# Patient Record
Sex: MALE | Race: WHITE | NOT HISPANIC OR LATINO | Employment: OTHER | URBAN - METROPOLITAN AREA
[De-identification: names, ages, dates, MRNs, and addresses within clinical notes are randomized per-mention and may not be internally consistent; named-entity substitution may affect disease eponyms.]

---

## 2017-07-19 PROBLEM — D50.9 IRON DEFICIENCY ANEMIA: Status: ACTIVE | Noted: 2017-07-19

## 2017-10-09 PROBLEM — R19.5 ABNORMAL STOOLS: Status: ACTIVE | Noted: 2017-10-09

## 2018-09-18 PROBLEM — D50.0 IRON DEFICIENCY ANEMIA DUE TO CHRONIC BLOOD LOSS: Status: ACTIVE | Noted: 2018-09-18

## 2018-09-18 PROBLEM — D50.9 IRON DEFICIENCY ANEMIA: Status: RESOLVED | Noted: 2017-07-19 | Resolved: 2018-09-18

## 2018-10-03 PROBLEM — E80.1 PORPHYRIA CUTANEA TARDA: Status: ACTIVE | Noted: 2018-10-03

## 2018-10-03 PROBLEM — R20.0 NUMBNESS OF FINGERS OF BOTH HANDS: Status: ACTIVE | Noted: 2018-10-03

## 2019-02-11 PROBLEM — I47.10 SVT (SUPRAVENTRICULAR TACHYCARDIA): Status: ACTIVE | Noted: 2019-02-11

## 2019-02-25 PROBLEM — G56.03 CARPAL TUNNEL SYNDROME, BILATERAL: Status: ACTIVE | Noted: 2019-02-25

## 2019-11-08 ENCOUNTER — OFFICE VISIT (OUTPATIENT)
Dept: OTOLARYNGOLOGY | Facility: CLINIC | Age: 44
End: 2019-11-08
Payer: MEDICARE

## 2019-11-08 VITALS
DIASTOLIC BLOOD PRESSURE: 93 MMHG | HEART RATE: 74 BPM | HEIGHT: 69 IN | BODY MASS INDEX: 37.52 KG/M2 | WEIGHT: 253.31 LBS | SYSTOLIC BLOOD PRESSURE: 140 MMHG

## 2019-11-08 DIAGNOSIS — J31.0 RHINITIS MEDICAMENTOSA: ICD-10-CM

## 2019-11-08 DIAGNOSIS — H69.93 ETD (EUSTACHIAN TUBE DYSFUNCTION), BILATERAL: ICD-10-CM

## 2019-11-08 DIAGNOSIS — R04.0 EPISTAXIS: Primary | ICD-10-CM

## 2019-11-08 DIAGNOSIS — T48.5X5A RHINITIS MEDICAMENTOSA: ICD-10-CM

## 2019-11-08 PROCEDURE — 99204 PR OFFICE/OUTPT VISIT, NEW, LEVL IV, 45-59 MIN: ICD-10-PCS | Mod: 25,S$PBB,, | Performed by: OTOLARYNGOLOGY

## 2019-11-08 PROCEDURE — 99203 OFFICE O/P NEW LOW 30 MIN: CPT | Mod: PBBFAC,PN,25 | Performed by: OTOLARYNGOLOGY

## 2019-11-08 PROCEDURE — 99204 OFFICE O/P NEW MOD 45 MIN: CPT | Mod: 25,S$PBB,, | Performed by: OTOLARYNGOLOGY

## 2019-11-08 PROCEDURE — 30901 CONTROL OF NOSEBLEED: CPT | Mod: S$PBB,LT,, | Performed by: OTOLARYNGOLOGY

## 2019-11-08 PROCEDURE — 99999 PR PBB SHADOW E&M-NEW PATIENT-LVL III: ICD-10-PCS | Mod: PBBFAC,,, | Performed by: OTOLARYNGOLOGY

## 2019-11-08 PROCEDURE — 30901 CONTROL OF NOSEBLEED: CPT | Mod: PBBFAC,PN | Performed by: OTOLARYNGOLOGY

## 2019-11-08 PROCEDURE — 99999 PR PBB SHADOW E&M-NEW PATIENT-LVL III: CPT | Mod: PBBFAC,,, | Performed by: OTOLARYNGOLOGY

## 2019-11-08 PROCEDURE — 30901 PR CTRL 2SEBLEED,ANTER,SIMPLE: ICD-10-PCS | Mod: S$PBB,LT,, | Performed by: OTOLARYNGOLOGY

## 2019-11-08 RX ORDER — FLUTICASONE PROPIONATE 50 MCG
2 SPRAY, SUSPENSION (ML) NASAL DAILY
Qty: 1 BOTTLE | Refills: 12 | Status: SHIPPED | OUTPATIENT
Start: 2019-11-08

## 2019-11-08 RX ORDER — PREDNISONE 20 MG/1
TABLET ORAL
Qty: 21 TABLET | Refills: 0 | Status: SHIPPED | OUTPATIENT
Start: 2019-11-08 | End: 2020-02-20 | Stop reason: ALTCHOICE

## 2019-11-08 RX ORDER — AMOXICILLIN AND CLAVULANATE POTASSIUM 875; 125 MG/1; MG/1
1 TABLET, FILM COATED ORAL 2 TIMES DAILY
Qty: 42 TABLET | Refills: 0 | Status: SHIPPED | OUTPATIENT
Start: 2019-11-08 | End: 2019-11-29

## 2019-11-08 NOTE — PROCEDURES
Procedures     PROCEDURE NOTE:  Control of Left  Anterior Epistaxis  Preprocedure diagnosis:  Recurrent epistaxis  Postprocedure diangosis:  Same  Complications:  None  Blood Loss:  Minimal    Procedure in detail:  After verbal consent was obtained, the patient's nasal cavity was anesthesized using topical Ponticaine.  Upon examination, the patient had ectatic vessels on his left anterior septum.  Under direct visualization with a head lamp and a nasal speculum, a silver nitrate stick was appiled to the patient's left anterior septum.  A minimal amount of bleeding was noted.  The patient tolerated the procedure well, and there were no significant complications.

## 2019-11-08 NOTE — PROGRESS NOTES
Chief Complaint   Patient presents with    Nasal Congestion     midface pain and pressure    Otalgia     bilateral, left ear worse, constant sharp pain in left ear    Ear Fullness   .    HPI:     Manuel Quiroga is a 44 y.o. male who is referred by Izzy Branham NP for evaluation of a several month  history of bilateral facail pain/pressure,  nasal congestion, and postnasal drip. He states these symptoms have been present for years.   He notes associated ear pian.  He describes difficulty breathing at night. There is bilateral nasal obstruction. He does use sinus rinses or nasal sprays. He has been using Astelin, Xyzal, Singulair, and Flonase with minimal relief. He is using Afrin nasal spray every 12 hours and sudafed daily.  He states that he has difficutly using the nasal sprays secondary to nosebleeds. He admits to midface pain and pressure.  He denies rhinorrhea and postnasal drip. There is not maxillary tooth pain. He  admits to headaches.  He has not had sinus or nasal surgery. There is no history of sinonasal trauma. Additionally, he has noted that he has been having nosebleeds from the left nostril daily for the last 5-7 days.    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    Seizures     Ulcer      Social History     Socioeconomic History    Marital status: Single     Spouse name: Not on file    Number of children: Not on file    Years of education: 14    Highest education level: Not on file   Occupational History    Not on file   Social Needs    Financial resource strain: Not on file    Food insecurity:     Worry: Not on file     Inability: Not on file    Transportation needs:     Medical:  Not on file     Non-medical: Not on file   Tobacco Use    Smoking status: Former Smoker     Packs/day: 2.00     Years: 9.00     Pack years: 18.00     Types: Cigarettes, Cigars     Start date: 7/18/1996     Last attempt to quit: 8/18/2004     Years since quitting: 15.2    Smokeless tobacco: Never Used   Substance and Sexual Activity    Alcohol use: No    Drug use: No    Sexual activity: Never   Lifestyle    Physical activity:     Days per week: Not on file     Minutes per session: Not on file    Stress: Not on file   Relationships    Social connections:     Talks on phone: Not on file     Gets together: Not on file     Attends Taoism service: Not on file     Active member of club or organization: Not on file     Attends meetings of clubs or organizations: Not on file     Relationship status: Not on file   Other Topics Concern    Not on file   Social History Narrative    Not on file     Past Surgical History:   Procedure Laterality Date    COLONOSCOPY      COLONOSCOPY N/A 10/9/2017    Procedure: COLONOSCOPY;  Surgeon: Bill Jones MD;  Location: [facility];  Service: Endoscopy;  Laterality: N/A;    MOUTH SURGERY      SKIN BIOPSY      SPINAL FUSION      TONSILLECTOMY       No family history on file.        Review of Systems  General: negative for chills, fever or weight loss  Psychological: negative for mood changes or depression  Ophthalmic: negative for blurry vision, photophobia or eye pain  ENT: see HPI  Respiratory: no cough, shortness of breath, or wheezing  Cardiovascular: no chest pain or dyspnea on exertion  Gastrointestinal: no abdominal pain, change in bowel habits, or black/ bloody stools  Musculoskeletal: negative for gait disturbance or muscular weakness  Neurological: no syncope or seizures; no ataxia  Dermatological: negative for puritis,  rash and jaundice  Hematologic/lymphatic: no easy bruising, no new lumps or bumps      Physical Exam:    Vitals:    11/08/19 1047   BP:  (!) 140/93   Pulse: 74       Constitutional: Well appearing / communicating without difficutly.  NAD.  Eyes: EOM I Bilaterally  Head/Face: Normocephalic.  Negative paranasal sinus pressure/tenderness.  Salivary glands WNL.  House Brackmann I Bilaterally.    Right Ear: Auricle normal appearance. External Auditory Canal within normal limits,TM w/o masses/lesions/perforations. TM mobility noted.   Left Ear: Auricle normal appearance. External Auditory Canal WNL,TM w/o masses/lesions/perforations. TM mobility noted.  Nose: No gross nasal septal deviation. Dilated blood vessels at left Kiesselbach's plexus. Inferior Turbinates 3+ bilaterally. No septal perforation. No masses/lesions. External nasal skin appears normal without masses/lesions.  Oral Cavity: Gingiva/lips within normal limits.  Dentition/gingiva healthy appearing. Mucus membranes moist. Floor of mouth soft, no masses palpated. Oral Tongue mobile. Hard Palate appears normal.    Oropharynx: Base of tongue appears normal. No masses/lesions noted. Tonsillar fossa/pharyngeal wall without lesions. Posterior oropharynx WNL.  Soft palate without masses. Midline uvula.   Neck/Lymphatic: No LAD I-VI bilaterally.  No thyromegaly.  No masses noted on exam.    Mirror laryngoscopy/nasopharyngoscopy: Active gag reflex.  Unable to perform.    Neuro/Psychiatric: AOx3.  Normal mood and affect.   Cardiovascular: Normal carotid pulses bilaterally, no increasing jugular venous distention noted at cervical region bilaterally.    Respiratory: Normal respiratory effort, no stridor, no retractions noted.      See separate procedure note for nasal cautery.      Assessment:    ICD-10-CM ICD-9-CM    1. Epistaxis R04.0 784.7    2. Rhinitis medicamentosa J31.0 472.0     T48.5X5A     3. ETD (Eustachian tube dysfunction), bilateral H69.83 381.81      The primary encounter diagnosis was Epistaxis. Diagnoses of Rhinitis medicamentosa and ETD (Eustachian tube dysfunction), bilateral were also  pertinent to this visit.      Plan:  No orders of the defined types were placed in this encounter.    Epistaxis: He was given a handout detailing different strategies to help increase his nasal moisture, including the use of saline spray throughout the day and a humidifier at night. In addition, he may use Afrin as needed for active bleeding.     I had a long discussion with the patient regarding the diagnosis of rhinitis medicamentosa.  We discussed the physiologic response of the nasal mucosa to the OTC nasal sprays, and that rhinitis medicamentosa is a condition of rebound nasal congestion as a result of extended use of topical decongestants that constrict blood vessels in the lining of the nose.  The patient expressed understand, and is ready to try and stop the use of topical nasal decongestants.  We discussed different options including the use of oral steroids to prevent rebound nasal congestion as well as serially diluting the current bottle of nasal decongestant with saline over the next two weeks to gradually wean from the medicine.  The patient would like to proceed with Prednisone taper.    Continue Flonase, Astelin, Singulair, and Xyzal.     We had a long discussion regarding the anatomy and function of the eustachian tube.  We discussed that the eustachian tube acts as a pump to keep the appropriate amount of pressure behind the ear drum.  I gave the patient a prescription for a nasal steroid spray to be used on a daily basis, and we discussed that it will take 2-3 weeks of daily use to achieve maximal effectiveness.    Shannon Vazquez MD

## 2019-12-02 ENCOUNTER — OFFICE VISIT (OUTPATIENT)
Dept: OTOLARYNGOLOGY | Facility: CLINIC | Age: 44
End: 2019-12-02
Payer: MEDICARE

## 2019-12-02 VITALS
BODY MASS INDEX: 39.28 KG/M2 | TEMPERATURE: 98 F | HEIGHT: 69 IN | SYSTOLIC BLOOD PRESSURE: 119 MMHG | HEART RATE: 76 BPM | WEIGHT: 265.19 LBS | DIASTOLIC BLOOD PRESSURE: 81 MMHG

## 2019-12-02 DIAGNOSIS — R04.0 EPISTAXIS: Primary | ICD-10-CM

## 2019-12-02 DIAGNOSIS — J32.8 OTHER CHRONIC SINUSITIS: ICD-10-CM

## 2019-12-02 DIAGNOSIS — K21.9 LARYNGOPHARYNGEAL REFLUX (LPR): ICD-10-CM

## 2019-12-02 DIAGNOSIS — H69.93 ETD (EUSTACHIAN TUBE DYSFUNCTION), BILATERAL: ICD-10-CM

## 2019-12-02 PROCEDURE — 30901 CONTROL OF NOSEBLEED: CPT | Mod: PBBFAC,PN | Performed by: OTOLARYNGOLOGY

## 2019-12-02 PROCEDURE — 99214 OFFICE O/P EST MOD 30 MIN: CPT | Mod: PBBFAC,PN,25 | Performed by: OTOLARYNGOLOGY

## 2019-12-02 PROCEDURE — 30901 PR CTRL 2SEBLEED,ANTER,SIMPLE: ICD-10-PCS | Mod: S$PBB,RT,, | Performed by: OTOLARYNGOLOGY

## 2019-12-02 PROCEDURE — 99214 OFFICE O/P EST MOD 30 MIN: CPT | Mod: 25,S$PBB,, | Performed by: OTOLARYNGOLOGY

## 2019-12-02 PROCEDURE — 30901 CONTROL OF NOSEBLEED: CPT | Mod: S$PBB,RT,, | Performed by: OTOLARYNGOLOGY

## 2019-12-02 PROCEDURE — 99999 PR PBB SHADOW E&M-EST. PATIENT-LVL IV: ICD-10-PCS | Mod: PBBFAC,,, | Performed by: OTOLARYNGOLOGY

## 2019-12-02 PROCEDURE — 99999 PR PBB SHADOW E&M-EST. PATIENT-LVL IV: CPT | Mod: PBBFAC,,, | Performed by: OTOLARYNGOLOGY

## 2019-12-02 PROCEDURE — 99214 PR OFFICE/OUTPT VISIT, EST, LEVL IV, 30-39 MIN: ICD-10-PCS | Mod: 25,S$PBB,, | Performed by: OTOLARYNGOLOGY

## 2019-12-02 RX ORDER — MUPIROCIN 20 MG/G
OINTMENT TOPICAL 2 TIMES DAILY
Qty: 15 G | Refills: 0 | Status: SHIPPED | OUTPATIENT
Start: 2019-12-02 | End: 2019-12-12

## 2019-12-02 RX ORDER — LEVOFLOXACIN 500 MG/1
500 TABLET, FILM COATED ORAL DAILY
Qty: 14 TABLET | Refills: 0 | Status: SHIPPED | OUTPATIENT
Start: 2019-12-02 | End: 2019-12-16

## 2019-12-02 NOTE — PROCEDURES
Procedures     PROCEDURE NOTE:  Control of Right Anterior Epistaxis  Preprocedure diagnosis:  Recurrent epistaxis   Postprocedure diangosis:  Same  Complications:  None  Blood Loss:  Minimal    Procedure in detail:  After verbal consent was obtained, the patient's nasal cavity was anesthesized using topical Ponticaine.  Upon examination, the patient had ectatic vessels on his right anterior septum.  Under direct visualization with a head lamp and a nasal speculum, a silver nitrate stick was appiled to his right  anterior septum.  A minimal amount of bleeding was noted.  The patient tolerated the procedure well, and there were no significant complications.

## 2019-12-02 NOTE — PROGRESS NOTES
Chief Complaint   Patient presents with    Follow-up     Patient states everytime he blows is nose has blood on the tissue.   .    HPI:     Manuel Quiorga is a 44 y.o. male who is referred by Izzy Branham NP for evaluation of a several month  history of bilateral facail pain/pressure,  nasal congestion, and postnasal drip. He states these symptoms have been present for years.   He notes associated ear pian.  He describes difficulty breathing at night. There is bilateral nasal obstruction. He does use sinus rinses or nasal sprays. He has been using Astelin, Xyzal, Singulair, and Flonase with minimal relief. He is using Afrin nasal spray every 12 hours and sudafed daily.  He states that he has difficutly using the nasal sprays secondary to nosebleeds. He admits to midface pain and pressure.  He denies rhinorrhea and postnasal drip. There is not maxillary tooth pain. He  admits to headaches.  He has not had sinus or nasal surgery. There is no history of sinonasal trauma. Additionally, he has noted that he has been having nosebleeds from the left nostril daily for the last 5-7 days.    Interval HPI 12/2/2019:  Mr. Quiroga follows up today for Epistaxis and chronic nasal congestion..  He states that he has noticed blood on his tissue every time he blows his nose.  He feels this is still coming from the left nostril. He reports that he completed his augmentin course and prednisone course from last visit. He states that he has been able to refrain from Afrin.  He has been using Flonase, Astelin Xyzal, and Singulair.  He feels that this has provided minimal relief.  He relates that he still feels continued nasal congestion, bilateral facial pain and pressure, postnasal drip, and decreased sense of smell.  He notes associated left aural fullness.  These symptoms seemed minimally improved with the medications prescribed above.    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    Seizures     Ulcer      Social History     Socioeconomic History    Marital status: Single     Spouse name: Not on file    Number of children: Not on file    Years of education: 14    Highest education level: Not on file   Occupational History    Not on file   Social Needs    Financial resource strain: Not on file    Food insecurity:     Worry: Not on file     Inability: Not on file    Transportation needs:     Medical: Not on file     Non-medical: Not on file   Tobacco Use    Smoking status: Former Smoker     Packs/day: 2.00     Years: 9.00     Pack years: 18.00     Types: Cigarettes, Cigars     Start date: 7/18/1996     Last attempt to quit: 8/18/2004     Years since quitting: 15.2    Smokeless tobacco: Never Used   Substance and Sexual Activity    Alcohol use: No    Drug use: No    Sexual activity: Never   Lifestyle    Physical activity:     Days per week: Not on file     Minutes per session: Not on file    Stress: Not on file   Relationships    Social connections:     Talks on phone: Not on file     Gets together: Not on file     Attends Restorationist service: Not on file     Active member of club or organization: Not on file     Attends meetings of clubs or organizations: Not on file     Relationship status: Not on file   Other Topics Concern    Not on file   Social History Narrative    Not on file     Past Surgical History:   Procedure Laterality Date    COLONOSCOPY      COLONOSCOPY N/A 10/9/2017    Procedure: COLONOSCOPY;  Surgeon: Bill Jones MD;  Location: [facility];  Service: Endoscopy;  Laterality: N/A;    MOUTH SURGERY      SKIN BIOPSY      SPINAL FUSION      TONSILLECTOMY       No family history on  file.        Review of Systems  General: negative for chills, fever or weight loss  Psychological: negative for mood changes or depression  Ophthalmic: negative for blurry vision, photophobia or eye pain  ENT: see HPI  Respiratory: no cough, shortness of breath, or wheezing  Cardiovascular: no chest pain or dyspnea on exertion  Gastrointestinal: no abdominal pain, change in bowel habits, or black/ bloody stools  Musculoskeletal: negative for gait disturbance or muscular weakness  Neurological: no syncope or seizures; no ataxia  Dermatological: negative for puritis,  rash and jaundice  Hematologic/lymphatic: no easy bruising, no new lumps or bumps      Physical Exam:    Vitals:    12/02/19 0923   BP: 119/81   Pulse: 76   Temp: 98.4 °F (36.9 °C)       Constitutional: Well appearing / communicating without difficutly.  NAD.  Eyes: EOM I Bilaterally  Head/Face: Normocephalic.  Negative paranasal sinus pressure/tenderness.  Salivary glands WNL.  House Brackmann I Bilaterally.    Right Ear: Auricle normal appearance. External Auditory Canal within normal limits,TM w/o masses/lesions/perforations. TM mobility noted.   Left Ear: Auricle normal appearance. External Auditory Canal WNL,TM w/o masses/lesions/perforations. TM mobility noted.  Nose: No gross nasal septal deviation. Dilated blood vessels at right Kiesselbach's plexus. Inferior Turbinates 3+ bilaterally. No septal perforation. No masses/lesions. External nasal skin appears normal without masses/lesions.  Oral Cavity: Gingiva/lips within normal limits.  Dentition/gingiva healthy appearing. Mucus membranes moist. Floor of mouth soft, no masses palpated. Oral Tongue mobile. Hard Palate appears normal.    Oropharynx: Base of tongue appears normal. No masses/lesions noted. Tonsillar fossa/pharyngeal wall without lesions. Posterior oropharynx WNL.  Soft palate without masses. Midline uvula.   Neck/Lymphatic: No LAD I-VI bilaterally.  No thyromegaly.  No masses noted on  exam.    Mirror laryngoscopy/nasopharyngoscopy: Active gag reflex.  Unable to perform.    Neuro/Psychiatric: AOx3.  Normal mood and affect.   Cardiovascular: Normal carotid pulses bilaterally, no increasing jugular venous distention noted at cervical region bilaterally.    Respiratory: Normal respiratory effort, no stridor, no retractions noted.      See separate procedure note for nasal cautery.      Assessment:    ICD-10-CM ICD-9-CM    1. Epistaxis R04.0 784.7    2. ETD (Eustachian tube dysfunction), bilateral H69.83 381.81    3. Other chronic sinusitis J32.8 473.8    4. Laryngopharyngeal reflux (LPR) K21.9 478.79      The primary encounter diagnosis was Epistaxis. Diagnoses of ETD (Eustachian tube dysfunction), bilateral, Other chronic sinusitis, and Laryngopharyngeal reflux (LPR) were also pertinent to this visit.      Plan:  No orders of the defined types were placed in this encounter.    Epistaxis:  Nasal cautery performed today.  I reiterated different strategies to help increase his nasal moisture, including the use of saline spray throughout the day and a humidifier at night. In addition, he may use Afrin as needed for active bleeding.    Start Levaquin 500mg PO daily for 14 days.   Continue Flonase, Astelin, Singulair, and Xyzal.     Obtain CT scan of the sinuses to further assess intraluminal patency.    We had a long discussion regarding the anatomy and function of the eustachian tube.  We discussed that the eustachian tube acts as a pump to keep the appropriate amount of pressure behind the ear drum.  Continue auto insufflation exercises.    Shannon Vaqzuez MD

## 2019-12-02 NOTE — PATIENT INSTRUCTIONS
Get Ayr nasal saline gel and use in both nostrils twice daily.    Do not blow nose for 1 week.  Sneeze with mouth open.  Do not take ibuprofen or aspirin for 1 week.  Place saline nasal gel in nostrils at bedtime.  May use saline nose drops during the day to prevent dryness.

## 2019-12-23 ENCOUNTER — TELEPHONE (OUTPATIENT)
Dept: OTOLARYNGOLOGY | Facility: CLINIC | Age: 44
End: 2019-12-23

## 2019-12-23 ENCOUNTER — HOSPITAL ENCOUNTER (OUTPATIENT)
Dept: RADIOLOGY | Facility: HOSPITAL | Age: 44
Discharge: HOME | End: 2019-12-23
Attending: OTOLARYNGOLOGY
Payer: MEDICARE

## 2019-12-23 DIAGNOSIS — J32.8 OTHER CHRONIC SINUSITIS: ICD-10-CM

## 2019-12-23 PROCEDURE — 70486 CT MAXILLOFACIAL W/O DYE: CPT | Mod: TC

## 2019-12-23 PROCEDURE — 70486 CT MEDTRONIC SINUSES WITHOUT: ICD-10-PCS | Mod: 26,,, | Performed by: RADIOLOGY

## 2019-12-23 PROCEDURE — 70486 CT MAXILLOFACIAL W/O DYE: CPT | Mod: 26,,, | Performed by: RADIOLOGY

## 2019-12-23 NOTE — TELEPHONE ENCOUNTER
Spoke with patient he was notified as per Dr Chahal CT results are normal. Patient verbalized understanding

## 2019-12-23 NOTE — TELEPHONE ENCOUNTER
----- Message from Shannon Vazquez MD sent at 12/23/2019 10:42 AM CST -----  Please call patient and notify of normal results of his CT scan of the sinuses.

## 2020-01-10 ENCOUNTER — OFFICE VISIT (OUTPATIENT)
Dept: OTOLARYNGOLOGY | Facility: CLINIC | Age: 45
End: 2020-01-10
Payer: MEDICARE

## 2020-01-10 VITALS
SYSTOLIC BLOOD PRESSURE: 130 MMHG | HEART RATE: 72 BPM | WEIGHT: 252.88 LBS | BODY MASS INDEX: 36.2 KG/M2 | HEIGHT: 70 IN | DIASTOLIC BLOOD PRESSURE: 91 MMHG

## 2020-01-10 DIAGNOSIS — R04.0 EPISTAXIS: Primary | ICD-10-CM

## 2020-01-10 DIAGNOSIS — J31.0 CHRONIC RHINITIS: ICD-10-CM

## 2020-01-10 DIAGNOSIS — H69.93 ETD (EUSTACHIAN TUBE DYSFUNCTION), BILATERAL: ICD-10-CM

## 2020-01-10 PROCEDURE — 30901 CONTROL OF NOSEBLEED: CPT | Mod: PBBFAC,PN | Performed by: OTOLARYNGOLOGY

## 2020-01-10 PROCEDURE — 30901 CONTROL OF NOSEBLEED: CPT | Mod: S$PBB,LT,, | Performed by: OTOLARYNGOLOGY

## 2020-01-10 PROCEDURE — 99213 OFFICE O/P EST LOW 20 MIN: CPT | Mod: PBBFAC,PN,25 | Performed by: OTOLARYNGOLOGY

## 2020-01-10 PROCEDURE — 30901 PR CTRL 2SEBLEED,ANTER,SIMPLE: ICD-10-PCS | Mod: S$PBB,LT,, | Performed by: OTOLARYNGOLOGY

## 2020-01-10 PROCEDURE — 99999 PR PBB SHADOW E&M-EST. PATIENT-LVL III: ICD-10-PCS | Mod: PBBFAC,,, | Performed by: OTOLARYNGOLOGY

## 2020-01-10 PROCEDURE — 99999 PR PBB SHADOW E&M-EST. PATIENT-LVL III: CPT | Mod: PBBFAC,,, | Performed by: OTOLARYNGOLOGY

## 2020-01-10 PROCEDURE — 99214 PR OFFICE/OUTPT VISIT, EST, LEVL IV, 30-39 MIN: ICD-10-PCS | Mod: 25,S$PBB,, | Performed by: OTOLARYNGOLOGY

## 2020-01-10 PROCEDURE — 99214 OFFICE O/P EST MOD 30 MIN: CPT | Mod: 25,S$PBB,, | Performed by: OTOLARYNGOLOGY

## 2020-01-10 NOTE — PROGRESS NOTES
Chief Complaint   Patient presents with    Follow-up    Epistaxis     two nose bleeds sine last visit, left nasal     Otalgia     left ear   .    HPI:     Manuel Quiroga is a 44 y.o. male who is referred by Izzy Branham NP for evaluation of a several month  history of bilateral facail pain/pressure,  nasal congestion, and postnasal drip. He states these symptoms have been present for years.   He notes associated ear pian.  He describes difficulty breathing at night. There is bilateral nasal obstruction. He does use sinus rinses or nasal sprays. He has been using Astelin, Xyzal, Singulair, and Flonase with minimal relief. He is using Afrin nasal spray every 12 hours and sudafed daily.  He states that he has difficutly using the nasal sprays secondary to nosebleeds. He admits to midface pain and pressure.  He denies rhinorrhea and postnasal drip. There is not maxillary tooth pain. He  admits to headaches.  He has not had sinus or nasal surgery. There is no history of sinonasal trauma. Additionally, he has noted that he has been having nosebleeds from the left nostril daily for the last 5-7 days.    Interval HPI1/10/2020:  Mr. Quiroga follows up today for epistaxis and chronic nasal congestion.  He has had a CT scan of the sinuses which was clear.  Since last visit he has had 2 episodes of bleeding from the left nostril.   He has been using Flonase, Astelin Xyzal, and Singulair.  He feels that this has provided some relief. He notes associated left aural fullness.     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    Seizures     Ulcer      Social History     Socioeconomic History    Marital status: Single      Spouse name: Not on file    Number of children: Not on file    Years of education: 14    Highest education level: Not on file   Occupational History    Not on file   Social Needs    Financial resource strain: Not on file    Food insecurity:     Worry: Not on file     Inability: Not on file    Transportation needs:     Medical: Not on file     Non-medical: Not on file   Tobacco Use    Smoking status: Former Smoker     Packs/day: 2.00     Years: 9.00     Pack years: 18.00     Types: Cigarettes, Cigars     Start date: 7/18/1996     Last attempt to quit: 8/18/2004     Years since quitting: 15.4    Smokeless tobacco: Never Used   Substance and Sexual Activity    Alcohol use: No    Drug use: No    Sexual activity: Never   Lifestyle    Physical activity:     Days per week: Not on file     Minutes per session: Not on file    Stress: Not on file   Relationships    Social connections:     Talks on phone: Not on file     Gets together: Not on file     Attends Alevism service: Not on file     Active member of club or organization: Not on file     Attends meetings of clubs or organizations: Not on file     Relationship status: Not on file   Other Topics Concern    Not on file   Social History Narrative    Not on file     Past Surgical History:   Procedure Laterality Date    COLONOSCOPY      COLONOSCOPY N/A 10/9/2017    Procedure: COLONOSCOPY;  Surgeon: Bill Jones MD;  Location: [facility];  Service: Endoscopy;  Laterality: N/A;    MOUTH SURGERY      SKIN BIOPSY      SPINAL FUSION      TONSILLECTOMY       No family history on file.        Review of Systems  General: negative for chills, fever or weight loss  Psychological: negative for mood changes or depression  Ophthalmic: negative for blurry vision, photophobia or eye pain  ENT: see HPI  Respiratory: no cough, shortness of breath, or wheezing  Cardiovascular: no chest pain or dyspnea on exertion  Gastrointestinal: no abdominal pain, change  in bowel habits, or black/ bloody stools  Musculoskeletal: negative for gait disturbance or muscular weakness  Neurological: no syncope or seizures; no ataxia  Dermatological: negative for puritis,  rash and jaundice  Hematologic/lymphatic: no easy bruising, no new lumps or bumps      Physical Exam:    Vitals:    01/10/20 1407   BP: (!) 130/91   Pulse: 72       Constitutional: Well appearing / communicating without difficutly.  NAD.  Eyes: EOM I Bilaterally  Head/Face: Normocephalic.  Negative paranasal sinus pressure/tenderness.  Salivary glands WNL.  House Brackmann I Bilaterally.    Right Ear: Auricle normal appearance. External Auditory Canal within normal limits,TM w/o masses/lesions/perforations. TM mobility noted.   Left Ear: Auricle normal appearance. External Auditory Canal WNL,TM w/o masses/lesions/perforations. TM mobility noted.  Nose: No gross nasal septal deviation. Dilated blood vessels at left Kiesselbach's plexus. Inferior Turbinates 3+ bilaterally. No septal perforation. No masses/lesions. External nasal skin appears normal without masses/lesions.  Oral Cavity: Gingiva/lips within normal limits.  Dentition/gingiva healthy appearing. Mucus membranes moist. Floor of mouth soft, no masses palpated. Oral Tongue mobile. Hard Palate appears normal.    Oropharynx: Base of tongue appears normal. No masses/lesions noted. Tonsillar fossa/pharyngeal wall without lesions. Posterior oropharynx WNL.  Soft palate without masses. Midline uvula.   Neck/Lymphatic: No LAD I-VI bilaterally.  No thyromegaly.  No masses noted on exam.    Mirror laryngoscopy/nasopharyngoscopy: Active gag reflex.  Unable to perform.    Neuro/Psychiatric: AOx3.  Normal mood and affect.   Cardiovascular: Normal carotid pulses bilaterally, no increasing jugular venous distention noted at cervical region bilaterally.    Respiratory: Normal respiratory effort, no stridor, no retractions noted.      See separate procedure note for nasal  cautery.    Diagnostic studies reviewed:   CT sinus 12/23/2019:  No significant paranasal sinus opacification.  Clinical correlation and further evaluation as warranted.    Assessment:    ICD-10-CM ICD-9-CM    1. Epistaxis R04.0 784.7    2. ETD (Eustachian tube dysfunction), bilateral H69.83 381.81    3. Chronic rhinitis J31.0 472.0      The primary encounter diagnosis was Epistaxis. Diagnoses of ETD (Eustachian tube dysfunction), bilateral and Chronic rhinitis were also pertinent to this visit.      Plan:  No orders of the defined types were placed in this encounter.    CT scan reviewed in detail with patient. Reassurance provided that I see no evidence of chronic sinusitis on CT scan of the sinuses. We discussed that his symptoms are most likely related to chronic rhinitis and epistaxis is worsened by chronic anticoagulation.     Epistaxis:  Nasal cautery performed today.  I reiterated different strategies to help increase his nasal moisture, including the use of saline spray throughout the day and a humidifier at night. In addition, he may use Afrin as needed for active bleeding.    Continue  Singulair, and Xyzal.   Discontinue Flonase and astelin for present.     Continue auto insufflation exercises.    This visit was 25 minutes in duration, with over 50% of the time spent in direct face-to-face counseling and coordination of care regarding the issues outlined above.      Shannon Vazquez MD

## 2020-02-12 ENCOUNTER — OFFICE VISIT (OUTPATIENT)
Dept: OTOLARYNGOLOGY | Facility: CLINIC | Age: 45
End: 2020-02-12
Payer: MEDICARE

## 2020-02-12 ENCOUNTER — CLINICAL SUPPORT (OUTPATIENT)
Dept: OTOLARYNGOLOGY | Facility: CLINIC | Age: 45
End: 2020-02-12
Payer: MEDICARE

## 2020-02-12 VITALS
WEIGHT: 253.31 LBS | HEIGHT: 70 IN | HEART RATE: 87 BPM | SYSTOLIC BLOOD PRESSURE: 128 MMHG | BODY MASS INDEX: 36.26 KG/M2 | DIASTOLIC BLOOD PRESSURE: 83 MMHG | TEMPERATURE: 99 F

## 2020-02-12 DIAGNOSIS — M26.622 ARTHRALGIA OF LEFT TEMPOROMANDIBULAR JOINT: ICD-10-CM

## 2020-02-12 DIAGNOSIS — H69.93 ETD (EUSTACHIAN TUBE DYSFUNCTION), BILATERAL: Primary | ICD-10-CM

## 2020-02-12 DIAGNOSIS — J31.0 CHRONIC RHINITIS: ICD-10-CM

## 2020-02-12 DIAGNOSIS — R04.0 EPISTAXIS: Primary | ICD-10-CM

## 2020-02-12 PROCEDURE — 99214 OFFICE O/P EST MOD 30 MIN: CPT | Mod: S$PBB,,, | Performed by: OTOLARYNGOLOGY

## 2020-02-12 PROCEDURE — 92567 TYMPANOMETRY: CPT | Mod: PBBFAC,PN | Performed by: AUDIOLOGIST-HEARING AID FITTER

## 2020-02-12 PROCEDURE — 99214 PR OFFICE/OUTPT VISIT, EST, LEVL IV, 30-39 MIN: ICD-10-PCS | Mod: S$PBB,,, | Performed by: OTOLARYNGOLOGY

## 2020-02-12 PROCEDURE — 99999 PR PBB SHADOW E&M-EST. PATIENT-LVL III: CPT | Mod: PBBFAC,,, | Performed by: OTOLARYNGOLOGY

## 2020-02-12 PROCEDURE — 99213 OFFICE O/P EST LOW 20 MIN: CPT | Mod: PBBFAC,PN,25 | Performed by: OTOLARYNGOLOGY

## 2020-02-12 PROCEDURE — 99999 PR PBB SHADOW E&M-EST. PATIENT-LVL III: ICD-10-PCS | Mod: PBBFAC,,, | Performed by: OTOLARYNGOLOGY

## 2020-02-12 NOTE — PATIENT INSTRUCTIONS
TMJ Syndrome  The temporomandibular joint (TMJ) is the joint that connects your lower jaw to your head. You can feel it in front of your ears when you open and close your mouth. TMJ disorders involve chronic or recurrent pain in the joint. When treated, symptoms of TMJ disorders usually go away within a few months.  Causes  There is no widely agreed-on cause of TMJ disorders. They have been linked to injury, arthritis, chronic fatigue syndrome, and fibromyalgia. A definite connection has not been shown, though.  Symptoms  · Pain in the face, jaw, or neck  · Pain with jaw movement or chewing  · Locking or catching sensation of the jaw  · Clicking, popping, or grinding sounds with movement of the TMJ  · Headache  · Ear pain  Home care  Modest, nonsurgical treatments are a good first step toward relieving symptoms. Try the approaches described below.  · Rest the jaw by avoiding crunchy or hard-to-chew foods. Do not eat hard or sticky candies. Soft foods and liquids are easier on the jaw.  · Protect your jaw while yawning. If you need to yawn, put your fist under your chin to prevent your mouth from opening up too wide.  · To help relieve pain, try applying hot or cold packs to the painful area. Try both hot and cold to find out which works best for you. If you use hot packs (small towels soaked in hot water), be careful not to burn yourself.  · You may take acetaminophen or ibuprofen for pain, unless you were given a different pain medicine. (Note: If you have chronic liver or kidney disease or have ever had a stomach ulcer or gastrointestinal bleeding, talk with your healthcare provider before using these medicines. Also talk to your provider if you are taking medicine to prevent blood clots.) Aspirin should never be given to anyone younger than 18 years of age who is ill with a viral infection or fever. It may cause severe liver or brain damage.  Reducing stress  If stress seems to be contributing to your symptoms,  try to identify the sources of stress in your life. These arent always obvious. Common stressors include:  · Everyday hassles (which can add up), such as traffic jams, missed appointments, or car trouble  · Major life changes, both good (such as a new baby or job promotion) and bad (loss of job or loss of a loved one)  · Overload: The feeling that you have too many responsibilities and can't take care of everything at once  · Helplessness: Feeling like your problems are more than you can solve  When possible, do something about your sources of stress. See if you can avoid hassles, limit the amount of change in your life at one time, and take breaks when you feel overloaded.  Unfortunately, many stressful situations cannot be avoided. So learning how to manage stress better is very important. Getting regular exercise, eating nutritious, balanced meals, and getting adequate rest all help to make everyday stress more manageable. Certain techniques are also helpful: relaxation and breathing exercises, visualization, biofeedback, meditation, or simply taking some time out to clear your mind. For more information, talk with your healthcare provider.  ·   Call 911  Call emergency services right away if any of these occur:  · Trouble breathing or swallowing, wheezing  · Confusion  · Extreme drowsiness or trouble awakening  · Fainting or loss of consciousness  · Rapid heart rate  When to seek medical advice  Call your healthcare provider right away if any of these occur:  · Your face becomes swollen or red.  · Your pain worsens.  · You have increasing neck, mouth, tooth, or throat pain.  · You develop a fever of 100.4F (38°C) or higher  Date Last Reviewed: 7/30/2015  © 3479-4937 [employer]. [address] [place] [address], [place] [address]. All rights reserved. This information is not intended as a substitute for professional medical care. Always follow your healthcare professional's instructions.

## 2020-02-12 NOTE — PROGRESS NOTES
Chief Complaint   Patient presents with    Epistaxis     Patient states no nose bleeds since last visit.    Follow-up     Patient states still has sinus presure on the left side of face.   .    HPI:     Manuel Quiroga is a 44 y.o. male who is referred by Izzy Branham NP for evaluation of a several month  history of bilateral facail pain/pressure,  nasal congestion, and postnasal drip. He states these symptoms have been present for years.   He notes associated ear pian.  He describes difficulty breathing at night. There is bilateral nasal obstruction. He does use sinus rinses or nasal sprays. He has been using Astelin, Xyzal, Singulair, and Flonase with minimal relief. He is using Afrin nasal spray every 12 hours and sudafed daily.  He states that he has difficutly using the nasal sprays secondary to nosebleeds. He admits to midface pain and pressure.  He denies rhinorrhea and postnasal drip. There is not maxillary tooth pain. He  admits to headaches.  He has not had sinus or nasal surgery. There is no history of sinonasal trauma. Additionally, he has noted that he has been having nosebleeds from the left nostril daily for the last 5-7 days.    Interval HPI 2/13/2020:   Follow up epistaxis/chronic rhinitis.  Mr. Quiroga reports no further epistaxis episodes since his last visit.  He states he is using nasal saline mist  and nasal saline gel and feels this has been helpful.  Has been using Singulair and Xyzal force chronic rhinitis symptoms and feels that this is somewhat helpful.  He does note the feeling of increased nasal congestion and sinus pressure on the left side of his face along with left otalgia.  He denies any rhinorrhea or postnasal drip or change in sense of smell or taste.  Has a history of TMJ arthralgia.    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    Seizures     Ulcer      Social History     Socioeconomic History    Marital status: Single     Spouse name: Not on file    Number of children: Not on file    Years of education: 14    Highest education level: Not on file   Occupational History    Not on file   Social Needs    Financial resource strain: Not on file    Food insecurity:     Worry: Not on file     Inability: Not on file    Transportation needs:     Medical: Not on file     Non-medical: Not on file   Tobacco Use    Smoking status: Former Smoker     Packs/day: 2.00     Years: 9.00     Pack years: 18.00     Types: Cigarettes, Cigars     Start date: 7/18/1996     Last attempt to quit: 8/18/2004     Years since quitting: 15.4    Smokeless tobacco: Never Used   Substance and Sexual Activity    Alcohol use: No    Drug use: No    Sexual activity: Never   Lifestyle    Physical activity:     Days per week: Not on file     Minutes per session: Not on file    Stress: Not on file   Relationships    Social connections:     Talks on phone: Not on file     Gets together: Not on file     Attends Mandaen service: Not on file     Active member of club or organization: Not on file     Attends meetings of clubs or organizations: Not on file     Relationship status: Not on file   Other Topics Concern    Not on file   Social History Narrative    Not on file     Past Surgical History:   Procedure Laterality Date    COLONOSCOPY      COLONOSCOPY N/A 10/9/2017    Procedure: COLONOSCOPY;  Surgeon: Bill Jones MD;  Location: [facility];  Service: Endoscopy;  Laterality: N/A;    MOUTH SURGERY      SKIN BIOPSY      SPINAL FUSION      TONSILLECTOMY       No family history on file.        Review of Systems  General: negative for chills, fever or weight loss  Psychological: negative for mood  changes or depression  Ophthalmic: negative for blurry vision, photophobia or eye pain  ENT: see HPI  Respiratory: no cough, shortness of breath, or wheezing  Cardiovascular: no chest pain or dyspnea on exertion  Gastrointestinal: no abdominal pain, change in bowel habits, or black/ bloody stools  Musculoskeletal: negative for gait disturbance or muscular weakness  Neurological: no syncope or seizures; no ataxia  Dermatological: negative for puritis,  rash and jaundice  Hematologic/lymphatic: no easy bruising, no new lumps or bumps      Physical Exam:    Vitals:    02/12/20 1420   BP: 128/83   Pulse: 87   Temp: 99 °F (37.2 °C)       Constitutional: Well appearing / communicating without difficutly.  NAD.  Eyes: EOM I Bilaterally  Head/Face: Normocephalic.  Negative paranasal sinus pressure/tenderness.  Salivary glands WNL.  House Brackmann I Bilaterally.    Right Ear: Auricle normal appearance. External Auditory Canal within normal limits,TM w/o masses/lesions/perforations. TM mobility noted.   Left Ear: Auricle normal appearance. External Auditory Canal WNL,TM w/o masses/lesions/perforations. TM mobility noted.  Nose: nasal septal deviation to the left.  Inferior Turbinates 3+ bilaterally. No septal perforation. No masses/lesions. External nasal skin appears normal without masses/lesions.  Oral Cavity: Gingiva/lips within normal limits.  Dentition/gingiva healthy appearing. Mucus membranes moist. Floor of mouth soft, no masses palpated. Oral Tongue mobile. Hard Palate appears normal.    Oropharynx: Base of tongue appears normal. No masses/lesions noted. Tonsillar fossa/pharyngeal wall without lesions. Posterior oropharynx WNL.  Soft palate without masses. Midline uvula.   Neck/Lymphatic: No LAD I-VI bilaterally.  No thyromegaly.  No masses noted on exam.    Mirror laryngoscopy/nasopharyngoscopy: Active gag reflex.  Unable to perform.    Neuro/Psychiatric: AOx3.  Normal mood and affect.   Cardiovascular: Normal  carotid pulses bilaterally, no increasing jugular venous distention noted at cervical region bilaterally.    Respiratory: Normal respiratory effort, no stridor, no retractions noted.        Diagnostic studies reviewed:   Tympanometry performed showing type a times bilaterally.  CT sinus 12/23/2019:  No significant paranasal sinus opacification.  Clinical correlation and further evaluation as warranted.        Assessment:    ICD-10-CM ICD-9-CM    1. Epistaxis R04.0 784.7    2. Chronic rhinitis J31.0 472.0    3. Arthralgia of left temporomandibular joint M26.622 524.62      The primary encounter diagnosis was Epistaxis. Diagnoses of Chronic rhinitis and Arthralgia of left temporomandibular joint were also pertinent to this visit.      Plan:  No orders of the defined types were placed in this encounter.    Reassurance again  provided that I see no evidence of chronic sinusitis on CT scan of the sinuses to correlate to the feeling of left facial pressure. We discussed that his symptoms are most likely related to chronic rhinitis and TMJ arthralgia .    I reiterated different strategies to help increase his nasal moisture, including the use of saline spray throughout the day and a humidifier at night. In addition, he may use Afrin as needed for active bleeding.    Continue  Singulair, and Xyzal.     We had a long discussion regarding the underlying pathology of temporomandibular joint dysfunction (TMD) as the cause of ear pain.  We further discussed conservative measures to treat TMD including avoiding gum and other foods that require lots of chewing, warm compresses, and scheduled antinflammatories.  If the pain persists, the patient will then schedule an appointment with a dentist for further evaluation.    Shannon Vazquez MD

## 2020-06-04 ENCOUNTER — TELEPHONE (OUTPATIENT)
Dept: OTOLARYNGOLOGY | Facility: CLINIC | Age: 45
End: 2020-06-04

## 2020-06-04 NOTE — TELEPHONE ENCOUNTER
Spoke with patient regarding his upcoming appointment on Friday 06/12/2020 that  won't be in the office that day. I asked  when was is last nosebleed. He stated he has not had one in about 6 weeks just to cancel his appointment, and if he needs to come back into the office he will just call. Patient voice understanding.

## 2020-12-09 PROBLEM — D68.52 PROTHROMBIN G20210A MUTATION: Status: ACTIVE | Noted: 2020-12-09

## 2021-05-06 ENCOUNTER — PATIENT MESSAGE (OUTPATIENT)
Dept: RESEARCH | Facility: HOSPITAL | Age: 46
End: 2021-05-06

## 2021-09-22 PROBLEM — J30.9 ALLERGIC RHINITIS: Status: ACTIVE | Noted: 2021-09-22

## 2021-09-22 PROBLEM — I10 HTN (HYPERTENSION): Status: ACTIVE | Noted: 2021-09-22

## 2021-09-22 PROBLEM — Z78.9 STATIN INTOLERANCE: Status: ACTIVE | Noted: 2021-09-22

## 2021-09-22 PROBLEM — K21.9 GERD (GASTROESOPHAGEAL REFLUX DISEASE): Status: ACTIVE | Noted: 2021-09-22

## 2021-09-22 PROBLEM — Z79.01 CHRONIC ANTICOAGULATION: Status: ACTIVE | Noted: 2021-09-22

## 2021-09-22 PROBLEM — E78.5 HLD (HYPERLIPIDEMIA): Status: ACTIVE | Noted: 2021-09-22

## 2021-09-22 PROBLEM — G89.29 CHRONIC THORACIC BACK PAIN: Status: ACTIVE | Noted: 2021-09-22

## 2021-09-22 PROBLEM — R20.0 BILATERAL HAND NUMBNESS: Status: RESOLVED | Noted: 2018-10-03 | Resolved: 2021-09-22

## 2021-09-22 PROBLEM — M54.6 CHRONIC THORACIC BACK PAIN: Status: ACTIVE | Noted: 2021-09-22

## 2021-09-22 PROBLEM — E79.0 HYPERURICEMIA: Status: ACTIVE | Noted: 2021-09-22

## 2021-09-22 PROBLEM — F51.4 NIGHT TERROR DISORDER: Status: ACTIVE | Noted: 2021-09-22

## 2021-09-22 PROBLEM — R19.5 ABNORMAL STOOLS: Status: RESOLVED | Noted: 2017-10-09 | Resolved: 2021-09-22

## 2023-06-23 PROBLEM — S43.432A TEAR OF LEFT GLENOID LABRUM: Status: ACTIVE | Noted: 2023-06-23

## 2024-05-06 ENCOUNTER — PROCEDURE VISIT (OUTPATIENT)
Dept: NEUROLOGY | Facility: CLINIC | Age: 49
End: 2024-05-06
Payer: MEDICARE

## 2024-05-06 DIAGNOSIS — G56.03 CARPAL TUNNEL SYNDROME, BILATERAL: ICD-10-CM

## 2024-05-06 DIAGNOSIS — R20.0 ARM NUMBNESS: ICD-10-CM

## 2024-05-06 DIAGNOSIS — R29.898 DECREASED GRIP STRENGTH: ICD-10-CM

## 2024-05-06 PROCEDURE — 95886 MUSC TEST DONE W/N TEST COMP: CPT | Mod: PBBFAC | Performed by: PSYCHIATRY & NEUROLOGY

## 2024-05-06 PROCEDURE — 99999 PR STA SHADOW: CPT | Mod: 26,PBBFAC,, | Performed by: PSYCHIATRY & NEUROLOGY

## 2024-05-06 PROCEDURE — 95911 NRV CNDJ TEST 9-10 STUDIES: CPT | Mod: S$PBB | Performed by: PSYCHIATRY & NEUROLOGY

## 2024-05-06 NOTE — PROCEDURES
REPORT OF EMG and NERVE CONDUCTION STUDY    Name: Manuel Quiroga  Date of Study:  5/6/2024  Referring Physician:  Dr Santos  Test Performed by:  MD Tracy  Full Values Attached  Informed Consent Scanned.   No anesthesia used.   Amount of Blood Loss: none. The patient tolerated this procedure well.       Informed consent was obtained prior to performing this study. Two patient identifiers were confirmed with the patient prior to performing this study. A time out to determine correct patient and and agreement on procedure performed was conducted prior to the concentric needle examination.    Reason for the study:  numb hands      Findings:   Nerve conduction studies of the bilateral median (motor and sensory)nerves, bilateral ulnar (motor and sensory) nerves, bilateral radial sensory nerves were performed.  Median motor distal latency was prolonged to 5.3ms on the right. Median palm to wrist latency was prolonged to 3.6ms on the right and 3.7m on the left.  Otherwise, amplitudes, distal latencies, conduction velocities, and F-waves were normal.   EMG of selected muscles of the bilateral arms were performed as indicated on the attached sheets.   Paraspinal muscles were not performed due to the patient's NOAC use  Insertional activity was normal without fasciculation or fibrillation, normal sized and phasia of motor units.      Impression:  Abnormal Study secondary to the Presence of:      Mild Bilateral Carpal Tunnel Syndrome    Please note: Cervical paraspinal evaluation was not done due to the patient's use of NOAC. If there is clinical concern about cervical radicular symptoms, correlation with C spine imaging would be needed.       Segrio Molina M.D. [facility].     A copy of this EMG/NCS report will be sent to Dr Santos . Thanks for sending this patient for EMG/NCS. The patient plans to await further recommendations from you regarding the above findings.

## 2024-10-02 PROBLEM — M79.18 MYOFASCIAL PAIN ON RIGHT SIDE: Status: ACTIVE | Noted: 2024-10-02

## 2024-10-02 PROBLEM — M67.911 DYSFUNCTION OF RIGHT ROTATOR CUFF: Status: ACTIVE | Noted: 2024-10-02

## 2025-03-14 ENCOUNTER — HOSPITAL ENCOUNTER (OUTPATIENT)
Dept: TRANSFUSION MEDICINE | Facility: HOSPITAL | Age: 50
Discharge: HOME | End: 2025-03-14
Payer: MEDICARE

## 2025-03-14 PROCEDURE — 99195 PHLEBOTOMY: CPT

## 2025-03-19 NOTE — PROGRESS NOTES
Pt to Blood Bank for therapeutic phlebotomy.  /75  P 67  T 98.0  Hct 45.  1 unit WB removed from  L arm.  Tolerated well.  Written/verbal instructions given.  Pressure wrap applied when hemostasis achieved.  Refreshments accepted.  Ambulatory to and from BB per self. Dx: Porphyria Cutanea

## 2025-06-18 ENCOUNTER — HOSPITAL ENCOUNTER (OUTPATIENT)
Dept: TRANSFUSION MEDICINE | Facility: HOSPITAL | Age: 50
Discharge: HOME | End: 2025-06-18
Payer: MEDICARE